# Patient Record
(demographics unavailable — no encounter records)

---

## 2024-10-15 NOTE — HISTORY OF PRESENT ILLNESS
[TextBox_4] : Mr. MCCOY is a 77 year old male with a history of pituitary tumor s/p surgery 2x (Transphenoidal), CAD, mitral regurgitation, Diabetes insipidus, HLD, low testosterone, idiopathic urticaria, low Vit D, childhood asthma, HTN, abnormal CT presenting to the office today for follow up pulmonary evaluation. His chief complaint is  -he notes feeling generally well -he notes difficulty breathing intermittently  -he notes intermittent diarrhea  -he notes bowels are regular -he notes vision is stable -he denies dysphonia -he notes gaining weight (12 lbs) -he notes sinuses are congested   -he denies any headaches, nausea, emesis, fever, chills, sweats, chest pain, chest pressure, coughing, wheezing, palpitations, constipation, dysphagia, vertigo, arthralgias, myalgias, leg swelling, itchy eyes, itchy ears, heartburn, reflux, or sour taste in the mouth.

## 2024-10-15 NOTE — ASSESSMENT
[FreeTextEntry1] : Mr. MCCOY is a 77 year old male with a history of pituitary tumor s/p surgery 2x (Transphenoidal), CAD, mitral regurgitation, Diabetes insipidus, HLD, low testosterone, idiopathic urticaria, low Vit D, childhood asthma, HTN, abnormal CT presenting to the office today for follow up pulmonary evaluation for abnormal CT-RML 2mm nodule, idiopathic urticaria, childhood asthma, GERD- now w/ Active semi-Asthma  His shortness of breath is multifactorial due to: -poor mechanics of breathing -out of shape -over weight -pulmonary disease  -asthma -cardiac disease  His chronic cough is felt to be multifactorial due to:  -?Asthma  -?LPR  problem 1: childhood asthma/asthma  -continue Ventolin 2 puffs Q6H, pre-exercise -continue Breztri 2 puffs BID -s/p Prednisone 30 mg x 5 days, 20 mg x 5 days, 10 mg x 5 days 5/2024 Information sheet given to the patient to be reviewed, this medication is never to be used without consulting the prescribing physician. Proper dietary restraint is necessary specifically salt containing foods, if any reaction may occur should be reported. -Inhaler technique reviewed as well as oral hygiene techniques reviewed with patient. Avoidance of cold air, extremes of temperature, rescue inhaler should be used before exercise. Order of medication reviewed with patient. Recommended use of a cool mist humidifier in the bedroom. -Asthma is believed to be caused by inherited (genetic) and environmental factor, but its exact cause is unknown. Asthma may be triggered by allergens, lung infections, or irritants in the air. Asthma triggers are different for each person.  problem 2: abnormal CT- unimpressive-likely post inflammatory vs malignant -s/p QuantiFERON gold test WNL -complete f/u CT 12/2024 -CAT scans are the only radiological modality to identify abnormalities within the lungs with regards to nodules/masses/lymph nodes. Risks, benefits were reviewed in detail. The guidelines for abnormalities include follow up CT scans at various intervals which could range from 6 weeks to 1 year intervals. If there is a change for the worse then consideration for a biopsy will be considered if the patient is a candidate. Second opinion evaluation with a thoracic surgeon or an interventional radiologist could be offered.  Problem 3: GERD / LPR -add Pepcid 40 mg QHS -continue omeprazole OTC 20 mg qAM PRN -Rule of 2s: avoid eating too much, eating too late, eating too spicy, eating two hours before bed. -Things to avoid including overeating, spicy foods, tight clothing, eating within three hours of bed, this list is not all inclusive. -For treatment of reflux, possible options discussed including diet control, H2 blockers, PPIs, as well as coating motility agents discussed as treatment options. Timing of meals and proximity of last meal to sleep were discussed. If symptoms persist, a formal gastrointestinal evaluation is needed.  Problem 4: COVID-19 -recommended SaNOtize nasal spray -s/p COVID-19 vaccine x3 -recommended to wait to receive the updated booster until additional data regarding efficacy is available  Problem 5: idiopathic urticaria -s/p blood work to include: asthma panel (+), food IgE panel (-), IgE level (-), eosinophil level (320), vitamin D level (-)  problem 6: cardiac disease -recommended to continue to follow up with Cardiologist ([facility])  problem 7: poor breathing mechanics -Proper breathing techniques were reviewed with an emphasis of exhalation. Patient instructed to breath in for 1 second and out for four seconds. Patient was encouraged to not talk while walking.  problem 8: overweight/ out of shape -recommended "10-Day Detox Diet" by Dr. Ronak Zapata -Weight loss, exercise, and diet control were discussed and are highly encouraged. Treatment options are given such as, aqua therapy, and contacting a nutritionist. Recommended to use the elliptical, stationary bike, less use of treadmill.  problem 9: health maintenance -recommended RSV vaccine in the Fall for anyone over the age of 60 -recommended yearly flu shot after October 15 (s/p 2024) -recommended strep pneumonia vaccines: Prevnar-13 vaccine, followed by Pneumo vaccine 23 one year following after 65 years old. (completed) -recommended early intervention for Upper Respiratory Infections (URIs) -recommended regular osteoporosis evaluations -recommended early dermatological evaluations -recommended after the age of 50 to the age of 70, colonoscopy every 5 years  F/U in 4-6 months  He is encouraged to call with any changes, concerns, or questions.

## 2024-10-15 NOTE — PROCEDURE
[FreeTextEntry1] : PFTs revealed mild restrictive dysfunction; FEV1 was  1.44L, which is 54.9% of predicted; normal flow volume loop.  PFTs were performed to evaluate for SOB  FENO was 31; a normal value being less than 25 Fractional exhaled nitric oxide (FENO) is regarded as a simple, noninvasive method for assessing eosinophilic airway inflammation. Produced by a variety of cells within the lung, nitric oxide (NO) concentrations are generally low in healthy individuals. However, high concentrations of NO appear to be involved in nonspecific host defense mechanisms and chronic inflammatory diseases such as asthma. The American Thoracic Society (ATS) therefore has recommended using FENO to aid in the diagnosis and monitoring of eosinophilic airway inflammation and asthma, and for identifying steroid responsive individuals whose chronic respiratory symptoms may be caused by airway inflammation.   The American Thoracic Society (ATS) strongly recommends the use of FeNO measurement to aid in the assessment, management, and long-term monitoring of asthma. In their 2011 clinical practice guideline, the ATS emphasizes the importance of using FeNO.

## 2024-10-15 NOTE — ADDENDUM
[FreeTextEntry1] : Documented by Devonte Varela acting as a scribe for Dr. Gabriel Graves on 10/15/2024. All medical record entries made by the Scribe were at my, Dr. Gabriel Graves's, direction and personally dictated by me on 10/15/2024. I have reviewed the chart and agree that the record accurately reflects my personal performance of the history, physical exam, assessment and plan. I have also personally directed, reviewed, and agree with the discharge instructions.

## 2024-10-15 NOTE — PHYSICAL EXAM
[No Acute Distress] : no acute distress [Normal Oropharynx] : normal oropharynx [III] : Mallampati Class: III [Normal Appearance] : normal appearance [No Neck Mass] : no neck mass [Normal Rate/Rhythm] : normal rate/rhythm [Normal S1, S2] : normal s1, s2 [No Murmurs] : no murmurs [No Resp Distress] : no resp distress [Clear to Auscultation Bilaterally] : clear to auscultation bilaterally [No Abnormalities] : no abnormalities [Kyphosis] : kyphosis [Benign] : benign [Normal Gait] : normal gait [No Clubbing] : no clubbing [No Cyanosis] : no cyanosis [No Edema] : no edema [FROM] : FROM [Normal Color/ Pigmentation] : normal color/ pigmentation [No Focal Deficits] : no focal deficits [Oriented x3] : oriented x3 [Normal Affect] : normal affect [TextBox_2] : overweight [TextBox_68] : I:E ratio 1:3; clear [TextBox_80] : kyphotic

## 2024-10-15 NOTE — REASON FOR VISIT
[Follow-Up] : a follow-up visit [TextBox_44] : SOB, abnormal CT RML 2mm nodule, idiopathic urticaria, childhood asthma, GERD, Active Asthma

## 2025-03-11 NOTE — DISCUSSION/SUMMARY
[FreeTextEntry1] : This is a 76-year-old man with past medical history significant for coronary artery disease, status post myocardial infarction, status post coronary stent at [facility] April 13, 2003 (coronary stent to the 100% lesion in the left circumflex artery which had associated thrombus), hypertension, hyperlipidemia, status post pituitary tumor surgery August 2022 at [facility] which was unsuccessful followed by radiation therapy, history of cranial AVM, status post vascular clip, history of 2-2 months status post removal, status post surgery for Dupuytren's contracture, mitral valve regurgitation, who comes in for a cardiac evaluation. He denies chest pain, shortness of breath, dizziness or syncope. Electrocardiogram done April 10, 2024 demonstrates sinus bradycardia 58 bpm is otherwise remarkable for T wave inversions in V1 through V6, and 1 and aVL. Lipid panel done October 20, 2023 demonstrated cholesterol 129, HDL 44, triglycerides 203, LDL 52, non-HDL cholesterol 85, and LDL calculated 52 mg/dL.  The patient will continue his usual medications. He is recovering from a broken wrist but doing well at the current time. He is currently hemodynamically stable and asymptomatic from a cardiac standpoint.  Electrocardiogram done October 23, 2023 demonstrates sinus bradycardia rate of 57 bpm is otherwise remarkable for tall R wave in V2, nonspecific ST wave changes. Lipid panel done October 20, 2023 demonstrated cholesterol 129, HDL of 44, LDL 52, non-HDL cholesterol 85, triglycerides of 203 mg/dL.  Patient will continue on his current doses of Zetia and Crestor therapy. He is currently hemodynamically stable and asymptomatic from a cardiac standpoint. Coronary CTA done May 27, 2022 demonstrated normal left main artery, proximal left anterior descending artery with 20 to 30% and a intramyocardial bridge was noted in the mid left anterior descending artery, first diagonal branch had a 30% narrowing second, third and fourth diagonal branches with small, left circumflex artery had a stent in place which appears patent, right coronary artery a 20 to 30% ostial lesion, and in the mid right coronary artery that could have been up to a 50% lesion with a 50% lesion in the distal right coronary artery. Patient will continue his current medications. We will blood work done today for lipid panel, and electrolytes.  His LDL target is less than 70 mg/dL. Electrocardiogram done September 14, 2022 demonstrated normal sinus rhythm rate 72 bpm is otherwise remarkable for tall R wave in V1 and V2 with T wave inversions in V1 through the 6, aVL and lead I. He will follow-up with his endocrinologist and primary care physician  PMH: The patient had a coronary CTA done May 27, 2022 which demonstrated 20 to 30% in the ostium of the right coronary artery, 40% in the proximal right coronary artery, and 50% narrowing in the mid right coronary artery, patent left circumflex artery with stent patent obtuse marginal branches, LAD had mixed plaque with 20 to 30% narrowing and an intramyocardial bridge in the mid left anterior descending artery and 30% narrowing in the first diagonal branch and normal left main artery.  Patient is currently hemodynamically stable and asymptomatic from a cardiac standpoint.  A right middle lobe 2 mm nodule was noted.  Patient and wife were informed that he needs to follow-up with his pulmonologist regarding this issue.  He is also interested in working with a registered dietitian to further improve his lipid profile.   Electrocardiogram done February 14, 2022 demonstrated normal sinus rhythm rate 68 bpm is otherwise remarkable for T wave inversions in V1 through V6, 1 and aVL, and tall R wave in V1.  The patient had a nuclear stress test done November 1, 2021 where he exercised for 6 minutes and 20 seconds and had large severe defect in the inferior lateral, inferior wall and basal anterior lateral walls that were fixed with minimal rachel-infarct ischemia there was a small sized moderate defect in the apical wall that was reversible with ischemia.  Echo Doppler examination done October 13, 2021 demonstrated calcified mitral valve annulus, mild mitral valve regurgitation, trace aortic valve regurgitation with aortic valve sclerosis borderline concentric left ventricular hypertrophy, minimal to mild tricuspid valve regurgitation, minimal pulmonic valve regurgitation, borderline concentric left ventricular hypertrophy, and mild segmental hypokinesis involving interventricular septum and inferior wall.  Blood work done October 13, 2021 demonstrated a cholesterol 138, HDL of 51, triglycerides of 81, non-HDL cholesterol of 87, and LDL direct of 66 mg/dL.  He is taking Plavix instead of aspirin as his antiplatelet agent.  PMH: The patient had a nuclear stress test done November 1, 2021 which demonstrated large defects in the inferior wall inferolateral wall, basal anterior lateral walls that were predominantly fixed consistent with infarction with minimal rachel-infarct ischemia there was a small size moderate defect in the apex that was reversible consistent with ischemia. The patient had a reduced ejection fraction of 35%  Echo Doppler examination done October 13, 2021 demonstrated calcified mitral annulus and mild mitral valve regurgitation, aortic valve sclerosis with trace aortic valve regurgitation, borderline concentric left ventricular hypertrophy, mild segmental hypokinesis of the interventricular septum and inferior wall, minimal to mild tricuspid valve regurgitation and minimal pulmonic valve regurgitation with estimated ejection fraction of 54%.  Cardiac catheterization done at [facility] April 13, 2003 done in the setting of an acute myocardial infarction demonstrated 100% occlusion of the left circumflex artery with thrombus in place (patient received a coronary stent), 65% proximal lesion in the left anterior descending artery, 40% lesion in the mid right coronary artery and 50% lesion in the proximal right coronary artery.  Electrocardiogram done October 13, 2021 demonstrated sinus bradycardia rate of 58 bpm is otherwise remarkable for T wave inversions in V1 through V6, 1 and aVL, right interventricular conduction delay and tall R wave in V1.   I offered him PCSK9 therapy.  He does not want to self inject himself nor does he want anyone else to do it for him.  He is on Plavix without aspirin because he was unable tolerate aspirin therapy.   The patient understands that aerobic exercises must be increased to 40 minutes 4 times per week. A detailed discussion of lifestyle modification was done today. The patient has a good understanding of the diagnosis, and treatment plan. Lifestyle modification was also outlined.  PMH: The patient reports that he had a myocardial infarction, and stent placement approximately 10 years ago [facility].  The patient is doing well the current time. He had a nuclear stress test done April 2018 which revealed an ejection fraction of 43% with evidence for scar without ischemia.

## 2025-03-11 NOTE — PHYSICAL EXAM
[Well Developed] : well developed [Well Nourished] : well nourished [No Acute Distress] : no acute distress [Normal Venous Pressure] : normal venous pressure [No Carotid Bruit] : no carotid bruit [Normal S1, S2] : normal S1, S2 [No Rub] : no rub [No Precordial Heave] : no precordial heave was noted [S3] : no S3 [S4] : no S4 [No Pitting Edema] : no pitting edema present [Rt] : no varicose veins of the right leg [Lt] : no varicose veins of the left leg [Right Carotid Bruit] : no bruit heard over the right carotid [Left Carotid Bruit] : no bruit heard over the left carotid [Right Femoral Bruit] : no bruit heard over the right femoral artery [Left Femoral Bruit] : no bruit heard over the left femoral artery [Bruit] : no bruit heard [Clear Lung Fields] : clear lung fields [Good Air Entry] : good air entry [No Respiratory Distress] : no respiratory distress  [Soft] : abdomen soft [Non Tender] : non-tender [No Masses/organomegaly] : no masses/organomegaly [Normal Bowel Sounds] : normal bowel sounds [Normal Gait] : normal gait [No Edema] : no edema [No Cyanosis] : no cyanosis [No Clubbing] : no clubbing [No Varicosities] : no varicosities [No Rash] : no rash [No Skin Lesions] : no skin lesions [Moves all extremities] : moves all extremities [No Focal Deficits] : no focal deficits [Normal Speech] : normal speech [Alert and Oriented] : alert and oriented [Normal memory] : normal memory [General Appearance - Well Developed] : well developed [Normal Appearance] : normal appearance [Well Groomed] : well groomed [General Appearance - Well Nourished] : well nourished [No Deformities] : no deformities [General Appearance - In No Acute Distress] : no acute distress [Normal Conjunctiva] : the conjunctiva exhibited no abnormalities [Normal Oral Mucosa] : normal oral mucosa [No Oral Pallor] : no oral pallor [No Oral Cyanosis] : no oral cyanosis [Normal Jugular Venous A Waves Present] : normal jugular venous A waves present [Normal Jugular Venous V Waves Present] : normal jugular venous V waves present [No Jugular Venous Milligan A Waves] : no jugular venous milligan A waves [Respiration, Rhythm And Depth] : normal respiratory rhythm and effort [Exaggerated Use Of Accessory Muscles For Inspiration] : no accessory muscle use [Auscultation Breath Sounds / Voice Sounds] : lungs were clear to auscultation bilaterally [Abdomen Soft] : soft [Abnormal Walk] : normal gait [Gait - Sufficient For Exercise Testing] : the gait was sufficient for exercise testing [Nail Clubbing] : no clubbing of the fingernails [Cyanosis, Localized] : no localized cyanosis [FreeTextEntry1] : Left arm ecchymosis due to clopidogrel [Skin Color & Pigmentation] : normal skin color and pigmentation [] : no rash [No Venous Stasis] : no venous stasis [Skin Lesions] : no skin lesions [No Skin Ulcers] : no skin ulcer [No Xanthoma] : no  xanthoma was observed [Oriented To Time, Place, And Person] : oriented to person, place, and time [Affect] : the affect was normal [Mood] : the mood was normal [No Anxiety] : not feeling anxious [5th Left ICS - MCL] : palpated at the 5th LICS in the midclavicular line [Normal] : normal [Normal Rate] : normal [Heart Rate ___] : [unfilled] bpm [Rhythm Regular] : regular [Normal S1] : normal S1 [Normal S2] : normal S2 [No Gallop] : no gallop heard [Click] : no click [Distant] : the heart sounds were ~L not distant [Pericardial Rub] : no pericardial rub [II] : a grade 2 [I] : a grade 1 [2+] : left 2+ [No Abnormalities] : the abdominal aorta was not enlarged and no bruit was heard [___ +] : bilateral [unfilled]U+ pitting edema to the ankles

## 2025-03-11 NOTE — ASSESSMENT
[FreeTextEntry1] : Prior note May 5, 2023 nurse practitioner Lorna.  This is a 75-year-old man with past medical history significant for coronary artery disease, status post myocardial infarction, status post coronary stent at [facility] April 13, 2003 (coronary stent to the 100% lesion in the left circumflex artery which had associated thrombus), hypertension, hyperlipidemia, asthma, pituitary tumor status post excision (in early 2000's; now on hormone replacement therapy with levothyroxine, hydrocortisone and fludrocortisone), history of cranial AVM, status post vascular clip, history of 2-2 months status post removal, status post surgery for Dupuytren's contracture, mitral valve regurgitation, who comes in for a cardiac evaluation.    Cardiac risk factors include coronary artery disease, myocardial infarction s/p stent placement, smoking history (~16 cigars daily; quit in 2003), hyperlipidemia and prediabetes.    He is s/p pituitary surgery which is scheduled on August 15, 2022 to be done by Dr. Torsten Holland at [facility]. Regarding recent hospitalization, [facility], Pituitary surgery for removal of tumor, 8/15-8/26 - course c/b infection, had been on antibiotics which were continued until 9/2. During hospitalization was found to have diabetes insipidus (2/2 pituitary surgery), started on desmopressin 0.5mg qd (managed by endocrinology).    He is currently on Plavix 75 mg, Zetia 10 mg daily, Metoprolol succinate 25 mg p.o. daily and Rosuvastatin 40 mg daily.    The patient is here with his wife on today's exam. She states she was recently told by his neurologist that he is scheduled to have pituitary surgery scheduled for possibly May 11th, 2023 (the date is still pending). He is scheduled to have this done by Dr. Torsten Holland at [facility] (FAX: [phone]). She states that she does not know if he needs cardiac clearance but happened to be coming in today for an appointment.    BLOOD PRESSURE:  -BP is controlled in today's visit.    BLOOD WORK:  -New blood work was done 05/05/2023 to evaluate lipid profile, CBC, BMP, hepatic function, A1C and TSH.    -New blood work was done July 27, 2022 which demonstrated normal lipid profile. He does have a history of decreased TSH of 0.01.  -Blood work done June 1, 2022 demonstrated total cholesterol of 241 and elevated triglyceride level of 293. He also had elevated liver enzymes AST/ALT 64/84.    TESTING/REPORTS:  -EKG done 05/05/2023 which demonstrated regular sinus rhythm with nonspecific ST-T wave changes BPM of 71 otherwise remarkable for T wave inversions in V1 through V6, 1 and aVL, and tall R wave in V1.    -Electrocardiogram done September 14, 2022 demonstrated normal sinus rhythm rate 72 bpm is otherwise remarkable for tall R wave in V1 and V2 with T wave inversions in V1 through the 6, aVL and lead I.    -EKG done 08/08/2022 which demonstrated regular sinus rhythm with nonspecific ST-T wave changes, BPM of 59 otherwise remarkable for T wave inversions in V1 through V6, 1 and aVL, and tall R wave in V1.    -EKG done Jul 27, 2022 which demonstrated regular sinus rhythm with nonspecific ST-T wave changes, BPM of 59 otherwise remarkable for T wave inversions in V1 through V6, 1 and aVL, and tall R wave in V1.    -The patient had a coronary CTA done May 27, 2022 which demonstrated 20 to 30% in the ostium of the right coronary artery, 40% in the proximal right coronary artery, and 50% narrowing in the mid right coronary artery, patent left circumflex artery with stent patent obtuse marginal branches, LAD had mixed plaque with 20 to 30% narrowing and an intramyocardial bridge in the mid left anterior descending artery and 30% narrowing in the first diagonal branch and normal left main artery.    A right middle lobe 2 mm nodule was noted. Patient and wife were informed that he needs to follow-up with his pulmonologist regarding this issue.    -Electrocardiogram done February 14, 2022 demonstrated normal sinus rhythm rate 68 bpm is otherwise remarkable for T wave inversions in V1 through V6, 1 and aVL, and tall R wave in V1.    -The patient had a nuclear stress test done November 1, 2021 where he exercised for 6 minutes and 20 seconds and had large severe defect in the inferior lateral, inferior wall and basal anterior lateral walls that were fixed with minimal rachel-infarct ischemia there was a small sized moderate defect in the apical wall that was reversible with ischemia.    -Echo Doppler examination done October 13, 2021 demonstrated calcified mitral valve annulus, mild mitral valve regurgitation, trace aortic valve regurgitation with aortic valve sclerosis borderline concentric left ventricular hypertrophy, minimal to mild tricuspid valve regurgitation, minimal pulmonic valve regurgitation, borderline concentric left ventricular hypertrophy, and mild segmental hypokinesis involving interventricular septum and inferior wall.    PLAN:  The patient is clear from a cardiac standpoint. Please avoid overhydration. The patient should be allowed to take their usual medications in the perioperative period. Maintain proper DVT prophylaxis. The patient should have an incentive spirometer in the perioperative period. This procedure should be done on Plavix therapy unless the risk of bleeding outweighs the benefit of the procedure. This ultimate decision is up to the performing surgeon.    -He will continue with his usual medications and will contact the office if he is having any complaints between now and their next follow up appointment.  -The patient will schedule an Echo Doppler examination to evaluate murmur, left ventricular function, chamber size, and rule out hypertrophy.  -He will follow up in 3 months.    I have discussed the plan of care with Mr. BUNNY MCCOY. He is compliant with all of his medications.    The patient understands that aerobic exercises must be increased to minutes 4 times/week and a detailed discussion of lifestyle modification was done today.  The patient has a good understanding of the diagnosis, treatment plan and lifestyle modification.  He will contact me at the office for any questions with their care or any changes in their health status.      Chemo BLUNT .

## 2025-03-11 NOTE — REASON FOR VISIT
Addended by: PREET JAVED on: 10/3/2023 06:41 AM     Modules accepted: Orders     [CV Risk Factors and Non-Cardiac Disease] : CV risk factors and non-cardiac disease [Follow-Up - From Hospitalization] : follow-up of a recent hospitalization for [Coronary Artery Disease] : coronary artery disease [Hyperlipidemia] : hyperlipidemia [Hypertension] : hypertension [Mitral Regurgitation] : mitral regurgitation [Palpitations] : palpitations [Prior Myocardial Infarction] : a prior myocardial infarction [FreeTextEntry2] : [facility], Pituitary surgery, 8/15-8/26 [FreeTextEntry1] : s/p stent [Spouse] : spouse

## 2025-07-01 NOTE — ASSESSMENT
[FreeTextEntry1] : Mr. MCCOY is a 77-year-old male with a history of pituitary tumor s/p surgery 2x (Transphenoidal), CAD, mitral regurgitation, Diabetes insipidus, HLD, low testosterone, idiopathic urticaria, low Vit D, childhood asthma, HTN-abnormal CT-RML 2mm nodule, idiopathic urticaria, childhood asthma, GERD- s/p semi-active asthma 10/2024- thriving on semaglutide  His shortness of breath is multifactorial due to: -poor mechanics of breathing -out of shape -over weight -pulmonary disease  -asthma -cardiac disease  His chronic cough is felt to be multifactorial due to:  -?Asthma  -?LPR  problem 1: childhood asthma/asthma  -continue Ventolin 2 puffs Q6H, pre-exercise -continue Breztri 2 puffs BID or Symbicort 160 2 inhalations BID -s/p Prednisone 30 mg x 5 days, 20 mg x 5 days, 10 mg x 5 days 5/2024 Information sheet given to the patient to be reviewed, this medication is never to be used without consulting the prescribing physician. Proper dietary restraint is necessary specifically salt containing foods, if any reaction may occur should be reported. -Inhaler technique reviewed as well as oral hygiene techniques reviewed with patient. Avoidance of cold air, extremes of temperature, rescue inhaler should be used before exercise. Order of medication reviewed with patient. Recommended use of a cool mist humidifier in the bedroom. -Asthma is believed to be caused by inherited (genetic) and environmental factor, but its exact cause is unknown. Asthma may be triggered by allergens, lung infections, or irritants in the air. Asthma triggers are different for each person.  problem 2: abnormal CT- unimpressive-likely post inflammatory vs malignant -s/p QuantiFERON gold test WNL -complete f/u CT 12/2024 (due) -CAT scans are the only radiological modality to identify abnormalities within the lungs with regards to nodules/masses/lymph nodes. Risks, benefits were reviewed in detail. The guidelines for abnormalities include follow up CT scans at various intervals which could range from 6 weeks to 1 year intervals. If there is a change for the worse then consideration for a biopsy will be considered if the patient is a candidate. Second opinion evaluation with a thoracic surgeon or an interventional radiologist could be offered.  Problem 3: GERD / LPR -off Pepcid 40 mg QHS -continue omeprazole OTC 20 mg BID pre-meal -Rule of 2s: avoid eating too much, eating too late, eating too spicy, eating two hours before bed. -Things to avoid including overeating, spicy foods, tight clothing, eating within three hours of bed, this list is not all inclusive. -For treatment of reflux, possible options discussed including diet control, H2 blockers, PPIs, as well as coating motility agents discussed as treatment options. Timing of meals and proximity of last meal to sleep were discussed. If symptoms persist, a formal gastrointestinal evaluation is needed.  Problem 4: COVID-19 -recommended SaNOtize nasal spray -s/p COVID-19 vaccine x3 -recommended to wait to receive the updated booster until additional data regarding efficacy is available  Problem 5: idiopathic urticaria -s/p blood work to include: asthma panel (+), food IgE panel (-), IgE level (-), eosinophil level (320), vitamin D level (-)  problem 6: cardiac disease -recommended to continue to follow up with Cardiologist ([facility])  problem 7: poor breathing mechanics -Proper breathing techniques were reviewed with an emphasis of exhalation. Patient instructed to breath in for 1 second and out for four seconds. Patient was encouraged to not talk while walking.  problem 8: overweight/ out of shape -semaglutide in progress -recommended "10-Day Detox Diet" by Dr. Ronak Zapata -Weight loss, exercise, and diet control were discussed and are highly encouraged. Treatment options are given such as, aqua therapy, and contacting a nutritionist. Recommended to use the elliptical, stationary bike, less use of treadmill.  problem 9: health maintenance -recommended RSV vaccine in the Fall for anyone over the age of 60 -s/p yearly flu shot 2024 -recommended strep pneumonia vaccines: Prevnar-13 vaccine, followed by Pneumo vaccine 23 one year following after 65 years old. (completed) -recommended early intervention for Upper Respiratory Infections (URIs) -recommended regular osteoporosis evaluations -recommended early dermatological evaluations -recommended after the age of 50 to the age of 70, colonoscopy every 5 years  F/P in 4 months  He is encouraged to call with any changes, concerns, or questions.

## 2025-07-01 NOTE — PHYSICAL EXAM
[No Acute Distress] : no acute distress [Normal Oropharynx] : normal oropharynx [III] : Mallampati Class: III [Normal Appearance] : normal appearance [No Neck Mass] : no neck mass [Normal Rate/Rhythm] : normal rate/rhythm [Normal S1, S2] : normal s1, s2 [No Murmurs] : no murmurs [No Resp Distress] : no resp distress [Clear to Auscultation Bilaterally] : clear to auscultation bilaterally [Kyphosis] : kyphosis [Benign] : benign [Normal Gait] : normal gait [No Clubbing] : no clubbing [No Cyanosis] : no cyanosis [No Edema] : no edema [FROM] : FROM [Normal Color/ Pigmentation] : normal color/ pigmentation [No Focal Deficits] : no focal deficits [Oriented x3] : oriented x3 [Normal Affect] : normal affect [TextBox_2] : overweight [TextBox_68] : I:E ratio 1:3; clear

## 2025-07-01 NOTE — HISTORY OF PRESENT ILLNESS
[TextBox_4] : Mr. MCCOY is a 77-year-old male with a history of pituitary tumor s/p surgery 2x (Transsphenoidal), CAD, mitral regurgitation, Diabetes insipidus, HLD, low testosterone, idiopathic urticaria, low Vit D, childhood asthma, HTN, abnormal CT presenting to the office today for a follow-up pulmonary evaluation.  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 He was on a prednisone taper for 21 days  -he denies any headaches, nausea, emesis, fever, chills, sweats, chest pain, chest pressure, diarrhea, constipation, dysphagia, coughing, wheezing, palpitations, vertigo, arthralgias, myalgias, leg swelling, itchy eyes, itchy ears, or sour taste in the mouth.

## 2025-07-01 NOTE — PROCEDURE
[FreeTextEntry1] : Full PFT reveals normal flows; FEV1 was 2.06L which is 90% of predicted; normal lung volumes; normal diffusion at 18.17, which is 89% of predicted; normal flow volume loop. PFTs were performed to evaluate for SOB  FENO was 61; a normal value being less than 25 Fractional exhaled nitric oxide (FENO) is regarded as a simple, noninvasive method for assessing eosinophilic airway inflammation. Produced by a variety of cells within the lung, nitric oxide (NO) concentrations are generally low in healthy individuals. However, high concentrations of NO appear to be involved in nonspecific host defense mechanisms and chronic inflammatory diseases such as asthma. The American Thoracic Society (ATS) therefore has recommended using FENO to aid in the diagnosis and monitoring of eosinophilic airway inflammation and asthma, and for identifying steroid responsive individuals whose chronic respiratory symptoms may be caused by airway inflammation.

## 2025-07-01 NOTE — ADDENDUM
[FreeTextEntry1] : Documented by Nicki Maria acting as a scribe for Dr. Gabriel Graves on 07/01/2025. All medical record entries made by the Scribe were at my, Dr. Gabriel Graves's, direction and personally dictated by me on 07/01/2025. I have reviewed the chart and agree that the record accurately reflects my personal performance of the history, physical exam, assessment and plan. I have also personally directed, reviewed, and agree with the discharge instructions.

## 2025-07-01 NOTE — REASON FOR VISIT
[Follow-Up] : a follow-up visit [Spouse] : spouse [TextBox_44] : SOB, abnormal CT RML 2mm nodule, idiopathic urticaria, childhood asthma, GERD, Active Asthma